# Patient Record
Sex: FEMALE | Race: WHITE | NOT HISPANIC OR LATINO | Employment: UNEMPLOYED | URBAN - METROPOLITAN AREA
[De-identification: names, ages, dates, MRNs, and addresses within clinical notes are randomized per-mention and may not be internally consistent; named-entity substitution may affect disease eponyms.]

---

## 2017-07-06 ENCOUNTER — CLINICAL SUPPORT NO REQUIREMENTS (OUTPATIENT)
Dept: CARDIOLOGY | Facility: CLINIC | Age: 42
End: 2017-07-06

## 2017-07-06 VITALS — SYSTOLIC BLOOD PRESSURE: 114 MMHG | WEIGHT: 224.2 LBS | HEART RATE: 65 BPM | DIASTOLIC BLOOD PRESSURE: 62 MMHG

## 2017-07-06 DIAGNOSIS — R55 NEUROCARDIOGENIC SYNCOPE: Primary | ICD-10-CM

## 2017-07-06 PROCEDURE — 93288 INTERROG EVL PM/LDLS PM IP: CPT | Performed by: PHYSICIAN ASSISTANT

## 2017-07-06 RX ORDER — LEVOTHYROXINE SODIUM 0.1 MG/1
100 TABLET ORAL EVERY MORNING
COMMUNITY

## 2017-07-06 RX ORDER — CETIRIZINE HYDROCHLORIDE 10 MG/1
10 TABLET ORAL DAILY
COMMUNITY
Start: 2015-11-20

## 2017-07-06 RX ORDER — HYDROCHLOROTHIAZIDE 12.5 MG/1
12.5 TABLET ORAL DAILY
COMMUNITY

## 2017-07-06 RX ORDER — OXYBUTYNIN CHLORIDE 5 MG/1
10 TABLET ORAL NIGHTLY
COMMUNITY
Start: 2015-12-02

## 2017-07-06 RX ORDER — FLUDROCORTISONE ACETATE 0.1 MG/1
0.1 TABLET ORAL 2 TIMES DAILY
COMMUNITY

## 2017-07-06 RX ORDER — PRAVASTATIN SODIUM 20 MG
20 TABLET ORAL NIGHTLY
COMMUNITY
Start: 2015-12-07

## 2017-07-06 RX ORDER — MONTELUKAST SODIUM 10 MG/1
10 TABLET ORAL NIGHTLY
COMMUNITY
Start: 2015-11-23

## 2017-07-06 RX ORDER — OMEPRAZOLE 40 MG/1
40 CAPSULE, DELAYED RELEASE ORAL DAILY
Status: ON HOLD | COMMUNITY
Start: 2015-11-20 | End: 2017-07-11

## 2017-07-06 RX ORDER — PAROXETINE HYDROCHLORIDE 40 MG/1
40 TABLET, FILM COATED ORAL NIGHTLY
COMMUNITY
Start: 2015-12-07

## 2017-07-06 RX ORDER — GABAPENTIN 300 MG/1
600 CAPSULE ORAL 3 TIMES DAILY
COMMUNITY

## 2017-07-06 RX ORDER — TOPIRAMATE 50 MG/1
50 TABLET, FILM COATED ORAL 2 TIMES DAILY
COMMUNITY
Start: 2015-12-29

## 2017-07-06 RX ORDER — BUSPIRONE HYDROCHLORIDE 5 MG/1
5 TABLET ORAL 2 TIMES DAILY
COMMUNITY
Start: 2016-01-04

## 2017-07-10 ENCOUNTER — PREP FOR SURGERY (OUTPATIENT)
Dept: OTHER | Facility: HOSPITAL | Age: 42
End: 2017-07-10

## 2017-07-10 DIAGNOSIS — Z45.010 PACEMAKER GENERATOR END OF LIFE: Primary | ICD-10-CM

## 2017-07-10 RX ORDER — VANCOMYCIN HYDROCHLORIDE 1 G/200ML
1000 INJECTION, SOLUTION INTRAVENOUS
Status: CANCELLED | OUTPATIENT
Start: 2017-07-10

## 2017-07-10 RX ORDER — ONDANSETRON 2 MG/ML
4 INJECTION INTRAMUSCULAR; INTRAVENOUS EVERY 6 HOURS PRN
Status: CANCELLED | OUTPATIENT
Start: 2017-07-10

## 2017-07-10 RX ORDER — SODIUM CHLORIDE 0.9 % (FLUSH) 0.9 %
1-10 SYRINGE (ML) INJECTION AS NEEDED
Status: CANCELLED | OUTPATIENT
Start: 2017-07-10

## 2017-07-11 ENCOUNTER — HOSPITAL ENCOUNTER (OUTPATIENT)
Facility: HOSPITAL | Age: 42
Setting detail: HOSPITAL OUTPATIENT SURGERY
Discharge: HOME | End: 2017-07-11
Attending: INTERNAL MEDICINE | Admitting: INTERNAL MEDICINE

## 2017-07-11 VITALS
HEIGHT: 60 IN | TEMPERATURE: 97.7 F | DIASTOLIC BLOOD PRESSURE: 80 MMHG | RESPIRATION RATE: 13 BRPM | WEIGHT: 221.78 LBS | BODY MASS INDEX: 43.54 KG/M2 | SYSTOLIC BLOOD PRESSURE: 132 MMHG | OXYGEN SATURATION: 97 % | HEART RATE: 69 BPM

## 2017-07-11 DIAGNOSIS — R55 NEUROCARDIOGENIC SYNCOPE: ICD-10-CM

## 2017-07-11 PROBLEM — R00.1 BRADYCARDIA: Status: ACTIVE | Noted: 2017-07-11

## 2017-07-11 PROBLEM — G90.A POTS (POSTURAL ORTHOSTATIC TACHYCARDIA SYNDROME): Status: ACTIVE | Noted: 2017-07-11

## 2017-07-11 PROBLEM — E03.9 HYPOTHYROID: Status: ACTIVE | Noted: 2017-07-11

## 2017-07-11 PROBLEM — E66.01 MORBID OBESITY (HCC): Status: ACTIVE | Noted: 2017-07-11

## 2017-07-11 PROBLEM — Z87.898 HISTORY OF CHEST PAIN: Status: ACTIVE | Noted: 2017-07-11

## 2017-07-11 PROBLEM — K21.9 GASTROESOPHAGEAL REFLUX DISEASE WITHOUT ESOPHAGITIS: Status: ACTIVE | Noted: 2017-07-11

## 2017-07-11 PROBLEM — J30.9 ALLERGIC RHINITIS: Status: ACTIVE | Noted: 2017-07-11

## 2017-07-11 PROBLEM — E78.5 DYSLIPIDEMIA: Status: ACTIVE | Noted: 2017-07-11

## 2017-07-11 PROBLEM — J45.909 ASTHMA: Status: ACTIVE | Noted: 2017-07-11

## 2017-07-11 LAB
ANION GAP SERPL CALCULATED.3IONS-SCNC: 6 MMOL/L (ref 3–11)
BUN BLD-MCNC: 8 MG/DL (ref 9–23)
BUN/CREAT SERPL: 10 (ref 7–25)
CALCIUM SPEC-SCNC: 8.8 MG/DL (ref 8.7–10.4)
CHLORIDE SERPL-SCNC: 112 MMOL/L (ref 99–109)
CO2 SERPL-SCNC: 23 MMOL/L (ref 20–31)
CREAT BLD-MCNC: 0.8 MG/DL (ref 0.6–1.3)
DEPRECATED RDW RBC AUTO: 45.3 FL (ref 37–54)
ERYTHROCYTE [DISTWIDTH] IN BLOOD BY AUTOMATED COUNT: 13.8 % (ref 11.3–14.5)
GFR SERPL CREATININE-BSD FRML MDRD: 79 ML/MIN/1.73
GLUCOSE BLD-MCNC: 98 MG/DL (ref 70–100)
HCG INTACT+B SERPL-ACNC: <5 MIU/ML
HCT VFR BLD AUTO: 35.8 % (ref 34.5–44)
HGB BLD-MCNC: 11.5 G/DL (ref 11.5–15.5)
MCH RBC QN AUTO: 29.3 PG (ref 27–31)
MCHC RBC AUTO-ENTMCNC: 32.1 G/DL (ref 32–36)
MCV RBC AUTO: 91.3 FL (ref 80–99)
PLATELET # BLD AUTO: 268 10*3/MM3 (ref 150–450)
PMV BLD AUTO: 11.2 FL (ref 6–12)
POTASSIUM BLD-SCNC: 3.6 MMOL/L (ref 3.5–5.5)
RBC # BLD AUTO: 3.92 10*6/MM3 (ref 3.89–5.14)
SODIUM BLD-SCNC: 141 MMOL/L (ref 132–146)
WBC NRBC COR # BLD: 6.54 10*3/MM3 (ref 3.5–10.8)

## 2017-07-11 PROCEDURE — 33227 REMOVE&REPLACE PM GEN SINGL: CPT | Performed by: INTERNAL MEDICINE

## 2017-07-11 PROCEDURE — 93005 ELECTROCARDIOGRAM TRACING: CPT | Performed by: INTERNAL MEDICINE

## 2017-07-11 PROCEDURE — 25010000002 FENTANYL CITRATE (PF) 100 MCG/2ML SOLUTION: Performed by: INTERNAL MEDICINE

## 2017-07-11 PROCEDURE — 36415 COLL VENOUS BLD VENIPUNCTURE: CPT

## 2017-07-11 PROCEDURE — 25010000002 ONDANSETRON PER 1 MG: Performed by: INTERNAL MEDICINE

## 2017-07-11 PROCEDURE — 84702 CHORIONIC GONADOTROPIN TEST: CPT | Performed by: INTERNAL MEDICINE

## 2017-07-11 PROCEDURE — C1786 PMKR, SINGLE, RATE-RESP: HCPCS | Performed by: INTERNAL MEDICINE

## 2017-07-11 PROCEDURE — 25010000002 MIDAZOLAM PER 1 MG: Performed by: INTERNAL MEDICINE

## 2017-07-11 PROCEDURE — 25010000002 VANCOMYCIN

## 2017-07-11 PROCEDURE — 80048 BASIC METABOLIC PNL TOTAL CA: CPT | Performed by: INTERNAL MEDICINE

## 2017-07-11 PROCEDURE — S0260 H&P FOR SURGERY: HCPCS | Performed by: INTERNAL MEDICINE

## 2017-07-11 PROCEDURE — 85027 COMPLETE CBC AUTOMATED: CPT | Performed by: INTERNAL MEDICINE

## 2017-07-11 DEVICE — GEN PM ADVISA SURESCAN SR MRI: Type: IMPLANTABLE DEVICE | Status: FUNCTIONAL

## 2017-07-11 RX ORDER — MIDAZOLAM HYDROCHLORIDE 1 MG/ML
INJECTION INTRAMUSCULAR; INTRAVENOUS AS NEEDED
Status: DISCONTINUED | OUTPATIENT
Start: 2017-07-11 | End: 2017-07-11 | Stop reason: HOSPADM

## 2017-07-11 RX ORDER — LIDOCAINE HYDROCHLORIDE 10 MG/ML
INJECTION, SOLUTION INFILTRATION; PERINEURAL AS NEEDED
Status: DISCONTINUED | OUTPATIENT
Start: 2017-07-11 | End: 2017-07-11 | Stop reason: HOSPADM

## 2017-07-11 RX ORDER — BUPIVACAINE HYDROCHLORIDE 5 MG/ML
INJECTION, SOLUTION EPIDURAL; INTRACAUDAL AS NEEDED
Status: DISCONTINUED | OUTPATIENT
Start: 2017-07-11 | End: 2017-07-11 | Stop reason: HOSPADM

## 2017-07-11 RX ORDER — ASPIRIN 81 MG/1
81 TABLET ORAL NIGHTLY
COMMUNITY

## 2017-07-11 RX ORDER — SODIUM CHLORIDE 0.9 % (FLUSH) 0.9 %
1-10 SYRINGE (ML) INJECTION AS NEEDED
Status: DISCONTINUED | OUTPATIENT
Start: 2017-07-11 | End: 2017-07-11 | Stop reason: HOSPADM

## 2017-07-11 RX ORDER — OMEPRAZOLE 20 MG/1
20 CAPSULE, DELAYED RELEASE ORAL 2 TIMES DAILY
COMMUNITY

## 2017-07-11 RX ORDER — ALBUTEROL SULFATE 90 UG/1
2 AEROSOL, METERED RESPIRATORY (INHALATION) EVERY 4 HOURS PRN
COMMUNITY

## 2017-07-11 RX ORDER — VANCOMYCIN HYDROCHLORIDE 1 G/200ML
1000 INJECTION, SOLUTION INTRAVENOUS
Status: DISCONTINUED | OUTPATIENT
Start: 2017-07-11 | End: 2017-07-11 | Stop reason: DRUGHIGH

## 2017-07-11 RX ORDER — FENTANYL CITRATE 50 UG/ML
INJECTION, SOLUTION INTRAMUSCULAR; INTRAVENOUS AS NEEDED
Status: DISCONTINUED | OUTPATIENT
Start: 2017-07-11 | End: 2017-07-11 | Stop reason: HOSPADM

## 2017-07-11 RX ORDER — PRAMIPEXOLE DIHYDROCHLORIDE 0.5 MG/1
0.5 TABLET ORAL 3 TIMES DAILY
COMMUNITY

## 2017-07-11 RX ORDER — ONDANSETRON 2 MG/ML
INJECTION INTRAMUSCULAR; INTRAVENOUS AS NEEDED
Status: DISCONTINUED | OUTPATIENT
Start: 2017-07-11 | End: 2017-07-11 | Stop reason: HOSPADM

## 2017-07-11 RX ORDER — ONDANSETRON 2 MG/ML
4 INJECTION INTRAMUSCULAR; INTRAVENOUS EVERY 6 HOURS PRN
Status: DISCONTINUED | OUTPATIENT
Start: 2017-07-11 | End: 2017-07-11 | Stop reason: HOSPADM

## 2017-07-11 RX ADMIN — VANCOMYCIN HYDROCHLORIDE 1500 MG: 1 INJECTION, POWDER, LYOPHILIZED, FOR SOLUTION INTRAVENOUS at 11:50

## 2017-07-11 NOTE — H&P
[facility] at [facility]        Date of Hospital Visit: 17      Place of Service: [facility]    Patient Name: Jadyn Mai  :1975    Referral Provider: Jad Murphy MD  Primary Care Provider: Brandi Hernandez MD    Chief complaint/Reason for Consultation:  Pacemaker at [facility]    Problem List:  Problem   Pots (Postural Orthostatic Tachycardia Syndrome)    a. Successful EP study in  and  with sinus mode modification and eventual Medtronic permanent pacemaker, 2008.  b. Florinef therapy.  C. Pacemaker at [facility] 10-20-16     Bradycardia   Dyslipidemia   History of Chest Pain    Remote negative cardiac stress test in . Found no evidence of ischemia. EF 65%.      Morbid Obesity   Asthma   Allergic Rhinitis   Hypothyroid   Gastroesophageal Reflux Disease Without Esophagitis     History of Present Illness:  This 42 year old female with a history of pots syndrome with a pacemaker implantation after sinus node modification in .  She fell out to electrophysiology follow-up after .  Patient states she had a recent episode of chest discomfort and shortness of breath.  Her family alerted EMS and she was taken to the [facility] emergency department.  There she apparently ruled out for MI but was found to be bradycardic.  She denies syncope.  She presented to our office a few days ago for device interrogation which revealed [facility] in 2016.  Her chest pain has resolved.  She has no orthopnea no PND.  No awareness of tachycardia palpitations and no syncope.    Allergies   Allergen Reactions   • Mobic [Meloxicam] Swelling     MOUTH AND TONGUE   • Reglan [Metoclopramide] Swelling     MOUTH AND TONGUE       Past Medical History:   Diagnosis Date   • Asthma    • Obese        Past Surgical History:   Procedure Laterality Date   • TONSILLECTOMY           Prescriptions Prior to Admission   Medication Sig Dispense Refill Last Dose   • aspirin 81 MG EC  tablet Take 81 mg by mouth Every Night.   7/10/2017 at Unknown time   • busPIRone (BUSPAR) 5 MG tablet Take 5 mg by mouth 2 (Two) Times a Day.   7/11/2017 at 0500   • cetirizine (zyrTEC) 10 MG tablet Take 10 mg by mouth As Needed.   7/10/2017 at Unknown time   • fludrocortisone 0.1 MG tablet Take 0.1 mg by mouth 2 (Two) Times a Day.   7/11/2017 at 0500   • gabapentin (NEURONTIN) 300 MG capsule Take 600 mg by mouth 3 (Three) Times a Day. 2 tab   7/11/2017 at 0500   • hydrochlorothiazide (HYDRODIURIL) 12.5 MG tablet Take 12.5 mg by mouth Daily.   7/10/2017 at Unknown time   • levothyroxine (SYNTHROID, LEVOTHROID) 100 MCG tablet Take 100 mcg by mouth Every Morning.   7/11/2017 at 0500   • metFORMIN (GLUCOPHAGE) 500 MG tablet Take 500 mg by mouth 2 (Two) Times a Day With Meals.   7/10/2017 at Unknown time   • montelukast (SINGULAIR) 10 MG tablet Take 10 mg by mouth Every Night.   7/10/2017 at Unknown time   • omeprazole (priLOSEC) 20 MG capsule Take 20 mg by mouth 2 (Two) Times a Day.   7/11/2017 at 0500   • oxybutynin (DITROPAN) 5 MG tablet Take 10 mg by mouth Every Night.   7/10/2017 at Unknown time   • PARoxetine (PAXIL) 40 MG tablet Take 40 mg by mouth Every Night.   7/10/2017 at Unknown time   • pramipexole (MIRAPEX) 0.5 MG tablet Take 0.5 mg by mouth 3 (Three) Times a Day. MAY TAKE 1-3 TABS QHS PRN   7/10/2017 at Unknown time   • pravastatin (PRAVACHOL) 20 MG tablet Take 20 mg by mouth Every Night.   7/10/2017 at Unknown time   • topiramate (TOPAMAX) 50 MG tablet Take 50 mg by mouth 2 (Two) Times a Day.   7/11/2017 at 0500   • albuterol (PROVENTIL HFA;VENTOLIN HFA) 108 (90 BASE) MCG/ACT inhaler Inhale 2 puffs Every 4 (Four) Hours As Needed for Wheezing.   Unknown at Unknown time         Social History     Social History   • Marital status:      Spouse name: N/A   • Number of children: N/A   • Years of education: N/A     Occupational History   • Not on file.     Social History Main Topics   • Smoking status:  "Never Smoker   • Smokeless tobacco: Not on file   • Alcohol use No   • Drug use: Not on file   • Sexual activity: Not on file     Other Topics Concern   • Not on file     Social History Narrative   • No narrative on file       No family history on file.    REVIEW OF SYSTEMS:   Review of Systems   Constitution: Negative.   HENT: Negative.    Eyes: Negative.    Cardiovascular: Positive for chest pain and leg swelling. Negative for syncope.   Respiratory: Negative.    Endocrine: Negative.    Hematologic/Lymphatic: Negative.    Skin: Negative.    Musculoskeletal: Negative.    Gastrointestinal: Negative.    Genitourinary: Negative.    Neurological: Negative.    Psychiatric/Behavioral: Negative.    Allergic/Immunologic: Negative.    All other systems reviewed and are negative.        Objective:     Vitals:    07/11/17 0741 07/11/17 0742   BP: 106/57 95/52   BP Location: Right arm Left arm   Patient Position: Lying Lying   Pulse: (!) 44    Resp: 18    Temp: 97.7 °F (36.5 °C)    TempSrc: Tympanic    SpO2: 97%    Weight: 221 lb 12.5 oz (101 kg)    Height: 60\" (152.4 cm)      Body mass index is 43.31 kg/(m^2).  Flowsheet Rows         First Filed Value    Admission Height  60\" (152.4 cm) Documented at 07/11/2017 0741    Admission Weight  221 lb 12.5 oz (101 kg) Documented at 07/11/2017 0741          Physical Exam   General: No acute distress, well-developed and well-nourished.    Skin: Skin is warm and dry. No obvious cyanosis, erythema or pallor.   HEENT: Atraumatic, normocephalic, no conjunctival pallor, no scleral icterus.   Neck: Supple, no JVD. Normal carotid upstrokes, no bruits.    Chest:No respiratory distres No chest wall tenderness. Breath sounds are normal. No wheezes,  rhonchi or rales.  Cardiovascular: Normal S1 and S2, no murmer, gallop or rub. PMI is not displaced.    Pulses:Radial and pedal pulses are 2+ and symmetric.    Abdomen: Soft, non tender, normal bowel sounds.   Musculoskeletal/Extremities:  No " clubbing, cyanosis or edema. No gross deformity.   Neurological: Alert and oriented to person, place, and time, no gross focal deficits.   Psychiatric: Normal mood and affect.Speech and behavior is normal.        Lab Review:                  Results from last 7 days  Lab Units 07/11/17  0741   SODIUM mmol/L 141   POTASSIUM mmol/L 3.6   CHLORIDE mmol/L 112*   CO2 mmol/L 23.0   BUN mg/dL 8*   CREATININE mg/dL 0.80   GLUCOSE mg/dL 98   CALCIUM mg/dL 8.8           Results from last 7 days  Lab Units 07/11/17  0741   WBC 10*3/mm3 6.54   HEMOGLOBIN g/dL 11.5   HEMATOCRIT % 35.8   PLATELETS 10*3/mm3 268       Assessment:   · Bradycardia  · Pots syndrome with history of sinus node modification with pacemaker generator past [facility]  Plan:   · Pacemaker generator change  · The procedure was explained to the patient/family extensively. Indications, benefits, risks and alternatives were discussed. The patient understands well, and wishes to proceed.     Scribed for Jad Murphy MD by Hugh Hong PA-C. 7/11/2017  8:29 AM    I have seen and examined the patient, case was discussed with the physician extender, reviewed the above note, necessary changes were made and I agree with the final note.   Jad Murphy MD, FACC, [facility]

## 2017-07-11 NOTE — NURSING NOTE
Medtronic Rep at bedside to interrogate device. Rep discussed findings and adjustments with Dr. Murphy. Interrogation strip placed in patient's chart. Pt vitals/rate stable.

## 2017-07-11 NOTE — PLAN OF CARE
Problem: Patient Care Overview (Adult)  Goal: Plan of Care Review  Outcome: Ongoing (interventions implemented as appropriate)  PROCEDURE TEACHING DONE WITH PT'    07/11/17 0788   Coping/Psychosocial Response Interventions   Plan Of Care Reviewed With patient   Patient Care Overview   Progress no change

## 2017-07-11 NOTE — PLAN OF CARE
Problem: Cardiac Rhythm Management Device (Adult)  Goal: Signs and Symptoms of Listed Potential Problems Will be Absent or Manageable (Cardiac Rhythm Management Device)  Outcome: Ongoing (interventions implemented as appropriate)  PROCEDURE TEACHING DONE WITH PT'    07/11/17 9062   Cardiac Rhythm Management Device   Problems Assessed (Cardiac Rhythm Management Device) all   Problems Present (Cardiac Rhythm Management Device) none

## 2017-07-11 NOTE — PLAN OF CARE
Problem: Patient Care Overview (Adult)  Goal: Plan of Care Review  Outcome: Outcome(s) achieved Date Met:  07/11/17 07/11/17 1655   Coping/Psychosocial Response Interventions   Plan Of Care Reviewed With patient;spouse   Patient Care Overview   Progress improving   Outcome Evaluation   Outcome Summary/Follow up Plan pt and family able to verbalize all discharge instructions. no questions at this time.        Goal: Discharge Needs Assessment  Outcome: Outcome(s) achieved Date Met:  07/11/17    Problem: Cardiac Rhythm Management Device (Adult)  Goal: Signs and Symptoms of Listed Potential Problems Will be Absent or Manageable (Cardiac Rhythm Management Device)  Outcome: Outcome(s) achieved Date Met:  07/11/17 07/11/17 1655   Cardiac Rhythm Management Device   Problems Assessed (Cardiac Rhythm Management Device) all   Problems Present (Cardiac Rhythm Management Device) none

## 2017-07-18 ENCOUNTER — OFFICE VISIT (OUTPATIENT)
Dept: CARDIOLOGY | Facility: CLINIC | Age: 42
End: 2017-07-18

## 2017-07-18 DIAGNOSIS — R00.1 BRADYCARDIA: Primary | ICD-10-CM

## 2017-07-18 PROCEDURE — 99024 POSTOP FOLLOW-UP VISIT: CPT | Performed by: INTERNAL MEDICINE

## 2017-10-17 ENCOUNTER — OFFICE VISIT (OUTPATIENT)
Dept: CARDIOLOGY | Facility: CLINIC | Age: 42
End: 2017-10-17

## 2017-10-17 VITALS
SYSTOLIC BLOOD PRESSURE: 133 MMHG | BODY MASS INDEX: 46.13 KG/M2 | HEART RATE: 94 BPM | HEIGHT: 60 IN | DIASTOLIC BLOOD PRESSURE: 87 MMHG | WEIGHT: 235 LBS

## 2017-10-17 DIAGNOSIS — R00.1 BRADYCARDIA: ICD-10-CM

## 2017-10-17 DIAGNOSIS — G90.A POTS (POSTURAL ORTHOSTATIC TACHYCARDIA SYNDROME): Primary | ICD-10-CM

## 2017-10-17 DIAGNOSIS — E78.5 DYSLIPIDEMIA: ICD-10-CM

## 2017-10-17 DIAGNOSIS — Z87.898 HISTORY OF CHEST PAIN: ICD-10-CM

## 2017-10-17 PROCEDURE — 99214 OFFICE O/P EST MOD 30 MIN: CPT | Performed by: INTERNAL MEDICINE

## 2017-10-17 RX ORDER — CEPHALEXIN 500 MG/1
500 CAPSULE ORAL 3 TIMES DAILY
Qty: 30 CAPSULE | Refills: 0 | Status: SHIPPED | OUTPATIENT
Start: 2017-10-17

## 2017-10-17 NOTE — PROGRESS NOTES
Encounter Date:10/17/2017      Patient ID: Jadyn Mai is a 42 y.o. female.    Chief Complaint: Slow Heart Rate    PROBLEM LIST:  1. Postural orthostatic tachycardia syndrome (POTS)  a. Successful EP study in 2007 and 2008 with sinus mode modification and eventual Medtronic permanent pacemaker, July 2008.  b. Florinef therapy.  c. Pacemaker at [facility] 10/20/2016  d. Generator change 7/11/2017  2. History of chest pain  a. Remote negative cardiac stress 2009.  No Evidence Ischemia.  EF 55%.  b. Echocardiogram, 2009 revealed moderate TR.  Normal LV function.  3. Bradycardia  4. Dyslipidemia  5. Morbid obesity  6. Asthma  7. Allergic rhinitis  8. Hypothyroidism  9. GERD without esophagitis    History of Present Illness  Patient presents today for follow-up with a history of POTS, chest pain and cardiac risk factors. Since her recent pacemaker generator change she has been feeling fine from a cardiac standpoint. Denies chest pain, shortness of breath, leg swelling, palpitations, and syncope. Remains busy and active as tolerated.    Allergies   Allergen Reactions   • Mobic [Meloxicam] Swelling     MOUTH AND TONGUE   • Reglan [Metoclopramide] Swelling     MOUTH AND TONGUE         Current Outpatient Prescriptions:   •  albuterol (PROVENTIL HFA;VENTOLIN HFA) 108 (90 BASE) MCG/ACT inhaler, Inhale 2 puffs Every 4 (Four) Hours As Needed for Wheezing., Disp: , Rfl:   •  aspirin 81 MG EC tablet, Take 81 mg by mouth Every Night., Disp: , Rfl:   •  busPIRone (BUSPAR) 5 MG tablet, Take 5 mg by mouth 2 (Two) Times a Day., Disp: , Rfl:   •  cetirizine (zyrTEC) 10 MG tablet, Take 10 mg by mouth Daily., Disp: , Rfl:   •  fludrocortisone 0.1 MG tablet, Take 0.1 mg by mouth 2 (Two) Times a Day., Disp: , Rfl:   •  gabapentin (NEURONTIN) 300 MG capsule, Take 600 mg by mouth 3 (Three) Times a Day. 2 tab, Disp: , Rfl:   •  hydrochlorothiazide (HYDRODIURIL) 12.5 MG tablet, Take 12.5 mg by mouth Daily., Disp: , Rfl:   •  levothyroxine  "(SYNTHROID, LEVOTHROID) 100 MCG tablet, Take 100 mcg by mouth Every Morning., Disp: , Rfl:   •  metFORMIN (GLUCOPHAGE) 500 MG tablet, Take 500 mg by mouth 2 (Two) Times a Day With Meals., Disp: , Rfl:   •  montelukast (SINGULAIR) 10 MG tablet, Take 10 mg by mouth Every Night., Disp: , Rfl:   •  omeprazole (priLOSEC) 20 MG capsule, Take 20 mg by mouth 2 (Two) Times a Day., Disp: , Rfl:   •  oxybutynin (DITROPAN) 5 MG tablet, Take 10 mg by mouth Every Night., Disp: , Rfl:   •  PARoxetine (PAXIL) 40 MG tablet, Take 40 mg by mouth Every Night., Disp: , Rfl:   •  pramipexole (MIRAPEX) 0.5 MG tablet, Take 0.5 mg by mouth 3 (Three) Times a Day. MAY TAKE 1-3 TABS QHS PRN, Disp: , Rfl:   •  pravastatin (PRAVACHOL) 20 MG tablet, Take 20 mg by mouth Every Night., Disp: , Rfl:   •  topiramate (TOPAMAX) 50 MG tablet, Take 50 mg by mouth 2 (Two) Times a Day., Disp: , Rfl:   •  cephalexin (KEFLEX) 500 MG capsule, Take 1 capsule by mouth 3 (Three) Times a Day., Disp: 30 capsule, Rfl: 0    The following portions of the patient's history were reviewed and updated as appropriate: allergies, current medications, past family history, past medical history, past social history, past surgical history and problem list.    ROS  Review of Systems   Constitution: Negative for chills, fever, weight gain and weight loss.   Cardiovascular: Negative for chest pain, claudication, dyspnea on exertion, leg swelling, orthopnea, palpitations, paroxysmal nocturnal dyspnea and syncope.        No dizziness   Gastrointestinal: Negative for abdominal pain, constipation, diarrhea, nausea and vomiting.   Genitourinary:        No urinary symptoms   Neurological:        No symptoms of stroke.   All other systems reviewed and are negative.    Objective:     Blood pressure 133/87, pulse 94, height 60\" (152.4 cm), weight 235 lb (107 kg).      Physical Exam   Constitutional: She appears well-developed and well-nourished.   HENT:   HEENT exam unremarkable.   Neck: " Neck supple. No JVD present.   No carotid bruits.   Cardiovascular: Normal rate, regular rhythm and normal heart sounds.    No murmur heard.  2 plus symmetric pulses.   Pulmonary/Chest: Breath sounds normal. She exhibits no tenderness.   Abdominal:   Abdomen benign.   Musculoskeletal: She exhibits no edema.   Neurological:   Neurological exam unremarkable.   Skin:   Implantation site is clean and healing well. Red protuberance noted, cardinal signs of inflammation not noted. Probably stitch granuloma.   Vitals reviewed.    Lab Review:   Procedures       Assessment:      Diagnosis Plan   1. POTS (postural orthostatic tachycardia syndrome)  Asymptomatic. Patient has redness at site of implantation, suspected stitch granuloma.  Start cephalexin 500 mg, 3 times daily, for 10 days.  We'll recheck site and follow-up.     2. History of chest pain  No recurrences.  Continue current medical regimen.     3. Bradycardia  No recurrences.  Continue current medical regimen    4. Dyslipidemia  On Pravachol 20 mg grams.      Plan:   On the outer edge of the pacemaker incision is slightly elevated blisters lesion was noted which appears to be a stitch granuloma, this was cleaned with alcohol swab covered with Band-Aid and I will start her on cephalexin 500 mg, 3 times daily, for 10 days. meanwhile she is advised to keep an eye on it for any evidence of infection.  We will see her in the office again to reevaluate the pacemaker site after one week and also schedule a pacemaker interrogation for that time.  Stable cardiac status.  Continue current medications.   FU in 7 days, sooner as needed.  Thank you for allowing us to participate in the care of your patient.     Jad Murphy MD, [facility], [facility]        Please note that portions of this note may have been completed with a voice recognition program. Efforts were made to edit the dictations, but occasionally words are mistranscribed.

## 2018-01-17 ENCOUNTER — TELEPHONE (OUTPATIENT)
Dept: CARDIOLOGY | Facility: CLINIC | Age: 43
End: 2018-01-17

## 2018-01-17 ENCOUNTER — CLINICAL SUPPORT NO REQUIREMENTS (OUTPATIENT)
Dept: CARDIOLOGY | Facility: CLINIC | Age: 43
End: 2018-01-17

## 2018-01-17 DIAGNOSIS — R00.1 BRADYCARDIA: Primary | ICD-10-CM

## 2018-01-17 NOTE — TELEPHONE ENCOUNTER
Called and spoke to pt.  She saw Dr Murphy in Oct and he wanted to f/u with pt in 1 week and pt no showed.  Pt sent in a remote monitoring reading with her Carelink box today.  Spoke to pt and she is still wanting to see Dr Murphy so I transferred her to scheduling to get an appt.

## 2018-07-18 ENCOUNTER — TELEPHONE (OUTPATIENT)
Dept: CARDIOLOGY | Facility: CLINIC | Age: 43
End: 2018-07-18

## 2018-07-18 NOTE — TELEPHONE ENCOUNTER
I called patient to ask if she was following with someone else because she has no showed her last 2 appointments. We were scheduled to receive a carelink transmission today and it did not come through. I have called both numbers  listed and there was no answer and no machine to leave a message.

## 2018-11-28 PROCEDURE — 93294 REM INTERROG EVL PM/LDLS PM: CPT | Performed by: INTERNAL MEDICINE

## 2018-11-28 PROCEDURE — 93296 REM INTERROG EVL PM/IDS: CPT | Performed by: INTERNAL MEDICINE

## 2018-11-29 ENCOUNTER — CLINICAL SUPPORT NO REQUIREMENTS (OUTPATIENT)
Dept: CARDIOLOGY | Facility: CLINIC | Age: 43
End: 2018-11-29

## 2018-11-29 DIAGNOSIS — R00.1 BRADYCARDIA: ICD-10-CM

## (undated) DEVICE — SET PRIMARY GRVTY 10DP MALE LL 104IN

## (undated) DEVICE — SOL NACL 0.9PCT 1000ML

## (undated) DEVICE — LIMB HOLDERS: Brand: DEROYAL

## (undated) DEVICE — ADULT, W/LG. BACK PAD, RADIOTRANSPARENT ELEMENT AND LEAD WIRE: Brand: DEFIBRILLATION ELECTRODES

## (undated) DEVICE — DECANTER: Brand: UNBRANDED

## (undated) DEVICE — PENCL E/S HNDSWCH ROCKRBTN HOLSTR 10FT

## (undated) DEVICE — DECANT BG O JET

## (undated) DEVICE — CAUTERY TIP POLISHER: Brand: DEVON

## (undated) DEVICE — LEX ELECTRO PHYSIOLOGY: Brand: MEDLINE INDUSTRIES, INC.

## (undated) DEVICE — ST EXT IV SMARTSITE 2VLV SP M LL 5ML IV1

## (undated) DEVICE — SKIN AFFIX SURG ADHESIVE 72/CS 0.55ML: Brand: MEDLINE

## (undated) DEVICE — PAD E/S GRND SGL/FOIL 9FT/CORD DISP

## (undated) DEVICE — IRRIGATOR BULB ASEPTO 60CC STRL

## (undated) DEVICE — TUBING, SUCTION, 1/4" X 10', STRAIGHT: Brand: MEDLINE

## (undated) DEVICE — MEDI-VAC YANKAUER SUCTION HANDLE W/BULBOUS TIP: Brand: CARDINAL HEALTH

## (undated) DEVICE — CANN NASL CO2 DIVIDED A/

## (undated) DEVICE — ST INF PRI SMRTSTE 20DRP 2VLV 24ML 117

## (undated) DEVICE — 3M™ TEGADERM™ CHG DRESSING 25/CARTON 4 CARTONS/CASE 1659: Brand: TEGADERM™